# Patient Record
Sex: FEMALE | Race: WHITE | NOT HISPANIC OR LATINO | Employment: FULL TIME | URBAN - METROPOLITAN AREA
[De-identification: names, ages, dates, MRNs, and addresses within clinical notes are randomized per-mention and may not be internally consistent; named-entity substitution may affect disease eponyms.]

---

## 2017-01-01 PROBLEM — Z30.431 SURVEILLANCE OF (INTRAUTERINE) CONTRACEPTIVE DEVICE: Status: ACTIVE | Noted: 2017-01-01

## 2017-01-05 ENCOUNTER — RADIANT APPOINTMENT (OUTPATIENT)
Dept: GENERAL RADIOLOGY | Facility: CLINIC | Age: 32
End: 2017-01-05
Attending: FAMILY MEDICINE
Payer: COMMERCIAL

## 2017-01-05 ENCOUNTER — OFFICE VISIT (OUTPATIENT)
Dept: FAMILY MEDICINE | Facility: CLINIC | Age: 32
End: 2017-01-05
Payer: COMMERCIAL

## 2017-01-05 VITALS
TEMPERATURE: 98.2 F | SYSTOLIC BLOOD PRESSURE: 111 MMHG | BODY MASS INDEX: 17.58 KG/M2 | DIASTOLIC BLOOD PRESSURE: 75 MMHG | OXYGEN SATURATION: 98 % | RESPIRATION RATE: 16 BRPM | WEIGHT: 103 LBS | HEART RATE: 73 BPM | HEIGHT: 64 IN

## 2017-01-05 DIAGNOSIS — M25.531 RIGHT WRIST PAIN: Primary | ICD-10-CM

## 2017-01-05 DIAGNOSIS — M25.531 RIGHT WRIST PAIN: ICD-10-CM

## 2017-01-05 DIAGNOSIS — Z80.8 FAMILY HISTORY OF MELANOMA: ICD-10-CM

## 2017-01-05 PROCEDURE — 73110 X-RAY EXAM OF WRIST: CPT | Mod: RT

## 2017-01-05 PROCEDURE — 99203 OFFICE O/P NEW LOW 30 MIN: CPT | Performed by: FAMILY MEDICINE

## 2017-01-05 RX ORDER — ASCORBIC ACID 125 MG
TABLET,CHEWABLE ORAL
COMMUNITY

## 2017-01-05 NOTE — MR AVS SNAPSHOT
After Visit Summary   1/5/2017    Jane Hernandez    MRN: [id number]           Patient Information     Date Of Birth          1985        Visit Information        Provider Department      1/5/2017 1:40 PM Cici Bradley MD [facility]        Today's Diagnoses     Right wrist pain    -  1     Family history of melanoma            Follow-ups after your visit        Additional Services     DERMATOLOGY REFERRAL       Your provider has referred you to: [facility]: [facility] [place] [phone]   [email or web address]    Please be aware that coverage of these services is subject to the terms and limitations of your health insurance plan.  Call member services at your health plan with any benefit or coverage questions.      Please bring the following with you to your appointment:    (1) Any X-Rays, CTs or MRIs which have been performed.  Contact the facility where they were done to arrange for  prior to your scheduled appointment.    (2) List of current medications  (3) This referral request   (4) Any documents/labs given to you for this referral                  Who to contact     If you have questions or need follow up information about today's clinic visit or your schedule please contact [facility] directly at [phone].  Normal or non-critical lab and imaging results will be communicated to you by MyChart, letter or phone within 4 business days after the clinic has received the results. If you do not hear from us within 7 days, please contact the clinic through MyChart or phone. If you have a critical or abnormal lab result, we will notify you by phone as soon as possible.  Submit refill requests through [employer] or call your pharmacy and they will forward the refill request to us. Please allow 3 business days for your refill to be completed.          Additional Information About Your Visit        MyChart Information   "   [employer] lets you send messages to your doctor, view your test results, renew your prescriptions, schedule appointments and more. To sign up, go to [email or web address] . Click on \"Log in\" on the left side of the screen, which will take you to the Welcome page. Then click on \"Sign up Now\" on the right side of the page.     You will be asked to enter the access code listed below, as well as some personal information. Please follow the directions to create your username and password.     Your access code is: [email or web address]  Expires: 3/23/2017  9:30 AM     Your access code will  in 90 days. If you need help or a new code, please call your [place] clinic or [phone].        Care EveryWhere ID     This is your Care EveryWhere ID. This could be used by other organizations to access your [place] medical records  [id number]        Your Vitals Were     Pulse Temperature Respirations Height BMI (Body Mass Index) Pulse Oximetry    73 98.2  F (36.8  C) (Oral) 16 5' 4\" (1.626 m) 17.67 kg/m2 98%    Breastfeeding?                   No            Blood Pressure from Last 3 Encounters:   17 111/75   16 117/68   11/25/15 111/64    Weight from Last 3 Encounters:   17 103 lb (46.72 kg)   16 102 lb (46.267 kg)              We Performed the Following     DERMATOLOGY REFERRAL        Primary Care Provider    No Ref-Primary Verified       No address on file        Thank you!     Thank you for choosing [facility]  for your care. Our goal is always to provide you with excellent care. Hearing back from our patients is one way we can continue to improve our services. Please take a few minutes to complete the written survey that you may receive in the mail after your visit with us. Thank you!             Your Updated Medication List - Protect others around you: Learn how to safely use, store and throw away your medicines at www.disposemymeds.org.          This list is accurate as of: " 1/5/17  2:27 PM.  Always use your most recent med list.                   Brand Name Dispense Instructions for use    levonorgestrel 20 MCG/24HR IUD    MIRENA     1 each by Intrauterine route once       MULTI ADULT GUMMIES Chew

## 2017-01-05 NOTE — PROGRESS NOTES
HPI  CC: 30 yo F presents to establish care and has right wrist pain.  Musculoskeletal problem/pain      Duration: ongoing     Description  Location: right wrist     Intensity:  mild    Accompanying signs and symptoms: none    History  Previous similar problem: no   Previous evaluation:  x-ray 6 years ago     Precipitating or alleviating factors:  Trauma or overuse: no   Aggravating factors include: lifting, weight bearing, extension and pressure     Therapies tried and outcome: physical therapy,     Additional: Pt is here to establish care with PCP.       In her early 20s, she started having pain in both wrists, negative x-rays at that time, went to one or two physical therapy appts, and the condition seemed to resolve on its own.  In the last few months, she started taking bar class; since starting this exercise, she began having similar wrist pain; it was harder to do a push up or any other weight-bearing exercise with wrist extension.   The left wrist pain resolved on its own, but the right wrist pain is persistent and maybe getting a little worse.  She still has pain only with wrist extension.  Even pulling off a sock caused brief wrist pain.  In neutral position, she has no pain.  She has to be careful with pulling open doors, picking up her purse, etc. There is no weakness, numbness or tingling.  There was no specific initial injury, just overuse.  She is left-handed.  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      Father with melanoma, she was getting derm skin checks, needs referral    She has an IUD.     Review of Systems   Musculoskeletal: Positive for joint pain (right wrist only).   All other systems reviewed and are negative.      Allergies   Allergen Reactions     Penicillins      Sulfa Drugs      Current Outpatient Prescriptions   Medication     Multiple Vitamins-Minerals (MULTI ADULT GUMMIES) CHEW     levonorgestrel (MIRENA) 20 MCG/24HR IUD     No current  facility-administered medications for this visit.     Active Ambulatory Problems     Diagnosis Date Noted     Surveillance of (intrauterine) contraceptive device 01/01/2017     Resolved Ambulatory Problems     Diagnosis Date Noted     No Resolved Ambulatory Problems     Past Medical History   Diagnosis Date     Allergic state      Past Surgical History   Procedure Laterality Date     Tonsillectomy       Family History   Problem Relation Age of Onset     Thyroid Disease Mother      hypothyroid     Hypertension Mother      Melanoma Father      Social History     Social History     Marital Status: Single     Spouse Name: N/A     Number of Children: N/A     Years of Education: N/A     Occupational History     Not on file.     Social History Main Topics     Smoking status: Never Smoker      Smokeless tobacco: Not on file     Alcohol Use: 0.0 oz/week     0 Standard drinks or equivalent per week      Comment: occasional     Drug Use: No     Sexual Activity:     Partners: Male     Birth Control/ Protection: IUD     Other Topics Concern     Not on file     Social History Narrative       Physical Exam   Constitutional: She is well-developed, well-nourished, and in no distress. No distress.   HENT:   Head: Normocephalic and atraumatic.   Right Ear: External ear normal.   Left Ear: External ear normal.   Nose: Nose normal.   Mouth/Throat: Oropharynx is clear and moist. No oropharyngeal exudate.   Eyes: Conjunctivae and EOM are normal. Pupils are equal, round, and reactive to light. Right eye exhibits no discharge. Left eye exhibits no discharge. No scleral icterus.   Neck: Normal range of motion. No thyromegaly present.   Cardiovascular: Normal rate, regular rhythm and normal heart sounds.  Exam reveals no gallop and no friction rub.    No murmur heard.  Pulmonary/Chest: Effort normal and breath sounds normal. No respiratory distress. She has no wheezes. She has no rales.   Abdominal: Soft. Bowel sounds are normal.  "  Musculoskeletal: Normal range of motion. She exhibits no edema or tenderness.   Bilateral wrists: no deformity, erythema or swelling, full ROM and normal strength, right wrist painful with extension against resistance.   Right elbow exam is normal.    Lymphadenopathy:     She has no cervical adenopathy.   Neurological: She is alert.   Skin: Skin is warm and dry. She is not diaphoretic.   Psychiatric: Affect normal.   Vitals reviewed.      /75 mmHg  Pulse 73  Temp(Src) 98.2  F (36.8  C) (Oral)  Resp 16  Ht 5' 4\" (1.626 m)  Wt 103 lb (46.72 kg)  BMI 17.67 kg/m2  SpO2 98%  Breastfeeding? No      A/P  1.  Right wrist pain: most likely tendonitis; brace given and advised to rest, ice, use NSAIDS to decrease inflammation; after 1-2 weeks, would re-evaluate.  Plain films today.    2. Family h/o melanoma: referral to dermatology done.    "

## 2017-01-05 NOTE — NURSING NOTE
"Chief Complaint   Patient presents with     Establish Care       Initial /75 mmHg  Pulse 73  Temp(Src) 98.2  F (36.8  C) (Oral)  Resp 16  Ht 5' 4\" (1.626 m)  Wt 103 lb (46.72 kg)  BMI 17.67 kg/m2  SpO2 98%  Breastfeeding? No Estimated body mass index is 17.67 kg/(m^2) as calculated from the following:    Height as of this encounter: 5' 4\" (1.626 m).    Weight as of this encounter: 103 lb (46.72 kg).  BP completed using cuff size: regular        Hermes Warren MA      "

## 2017-01-12 ENCOUNTER — TELEPHONE (OUTPATIENT)
Dept: FAMILY MEDICINE | Facility: CLINIC | Age: 32
End: 2017-01-12

## 2017-01-12 NOTE — TELEPHONE ENCOUNTER
Please order right wrist brace so I can attach order with DME form.  Pt was seen on 1/5/2017    Thanks.      Hermes Warren MA

## 2017-01-19 ENCOUNTER — TRANSFERRED RECORDS (OUTPATIENT)
Dept: HEALTH INFORMATION MANAGEMENT | Facility: CLINIC | Age: 32
End: 2017-01-19

## 2017-01-20 PROBLEM — Z12.4 CERVICAL CANCER SCREENING: Status: ACTIVE | Noted: 2017-01-20

## 2017-01-20 PROBLEM — Z30.431 SURVEILLANCE OF (INTRAUTERINE) CONTRACEPTIVE DEVICE: Status: RESOLVED | Noted: 2017-01-01 | Resolved: 2017-01-20

## 2018-06-17 ENCOUNTER — HEALTH MAINTENANCE LETTER (OUTPATIENT)
Age: 33
End: 2018-06-17

## 2018-07-20 ENCOUNTER — TRANSFERRED RECORDS (OUTPATIENT)
Dept: HEALTH INFORMATION MANAGEMENT | Facility: CLINIC | Age: 33
End: 2018-07-20

## 2018-07-24 ASSESSMENT — ANXIETY QUESTIONNAIRES
GAD7 TOTAL SCORE: 2
2. NOT BEING ABLE TO STOP OR CONTROL WORRYING: NOT AT ALL
4. TROUBLE RELAXING: NOT AT ALL
GAD7 TOTAL SCORE: 2
5. BEING SO RESTLESS THAT IT IS HARD TO SIT STILL: NOT AT ALL
3. WORRYING TOO MUCH ABOUT DIFFERENT THINGS: SEVERAL DAYS
GAD7 TOTAL SCORE: 2
7. FEELING AFRAID AS IF SOMETHING AWFUL MIGHT HAPPEN: NOT AT ALL
6. BECOMING EASILY ANNOYED OR IRRITABLE: NOT AT ALL
1. FEELING NERVOUS, ANXIOUS, OR ON EDGE: SEVERAL DAYS
7. FEELING AFRAID AS IF SOMETHING AWFUL MIGHT HAPPEN: NOT AT ALL

## 2018-07-24 ASSESSMENT — ENCOUNTER SYMPTOMS
BACK PAIN: 0
ARTHRALGIAS: 1
MYALGIAS: 0
MUSCLE WEAKNESS: 0
MUSCLE CRAMPS: 0
JOINT SWELLING: 0
NECK PAIN: 0
STIFFNESS: 0

## 2018-07-25 ENCOUNTER — OFFICE VISIT (OUTPATIENT)
Dept: FAMILY MEDICINE | Facility: CLINIC | Age: 33
End: 2018-07-25
Payer: COMMERCIAL

## 2018-07-25 ENCOUNTER — MYC MEDICAL ADVICE (OUTPATIENT)
Dept: FAMILY MEDICINE | Facility: CLINIC | Age: 33
End: 2018-07-25

## 2018-07-25 VITALS
OXYGEN SATURATION: 100 % | DIASTOLIC BLOOD PRESSURE: 68 MMHG | HEART RATE: 65 BPM | BODY MASS INDEX: 18.27 KG/M2 | SYSTOLIC BLOOD PRESSURE: 109 MMHG | WEIGHT: 107 LBS | HEIGHT: 64 IN | RESPIRATION RATE: 18 BRPM | TEMPERATURE: 98.2 F

## 2018-07-25 DIAGNOSIS — Z83.49 FAMILY HISTORY OF THYROID DISEASE: ICD-10-CM

## 2018-07-25 DIAGNOSIS — Z78.9 VEGETARIAN DIET: Primary | ICD-10-CM

## 2018-07-25 DIAGNOSIS — Z00.00 ROUTINE GENERAL MEDICAL EXAMINATION AT A HEALTH CARE FACILITY: Primary | ICD-10-CM

## 2018-07-25 DIAGNOSIS — Z23 NEED FOR TDAP VACCINATION: ICD-10-CM

## 2018-07-25 LAB
CHOLEST SERPL-MCNC: 131 MG/DL
HDLC SERPL-MCNC: 55 MG/DL
LDLC SERPL CALC-MCNC: 58 MG/DL
NONHDLC SERPL-MCNC: 76 MG/DL
TRIGL SERPL-MCNC: 90 MG/DL
TSH SERPL DL<=0.005 MIU/L-ACNC: 2.5 MU/L (ref 0.4–4)

## 2018-07-25 PROCEDURE — 90715 TDAP VACCINE 7 YRS/> IM: CPT | Performed by: FAMILY MEDICINE

## 2018-07-25 PROCEDURE — 83540 ASSAY OF IRON: CPT | Performed by: FAMILY MEDICINE

## 2018-07-25 PROCEDURE — 36415 COLL VENOUS BLD VENIPUNCTURE: CPT | Performed by: FAMILY MEDICINE

## 2018-07-25 PROCEDURE — 90471 IMMUNIZATION ADMIN: CPT | Performed by: FAMILY MEDICINE

## 2018-07-25 PROCEDURE — 83550 IRON BINDING TEST: CPT | Performed by: FAMILY MEDICINE

## 2018-07-25 PROCEDURE — 99395 PREV VISIT EST AGE 18-39: CPT | Mod: 25 | Performed by: FAMILY MEDICINE

## 2018-07-25 PROCEDURE — 84443 ASSAY THYROID STIM HORMONE: CPT | Performed by: FAMILY MEDICINE

## 2018-07-25 PROCEDURE — 82607 VITAMIN B-12: CPT | Performed by: FAMILY MEDICINE

## 2018-07-25 PROCEDURE — 80061 LIPID PANEL: CPT | Performed by: FAMILY MEDICINE

## 2018-07-25 ASSESSMENT — ANXIETY QUESTIONNAIRES: GAD7 TOTAL SCORE: 2

## 2018-07-25 NOTE — MR AVS SNAPSHOT
After Visit Summary   7/25/2018    Jane Hernandez    MRN: [id number]           Patient Information     Date Of Birth          1985        Visit Information        Provider Department      7/25/2018 8:00 AM Cici Bradley MD [facility]        Today's Diagnoses     Routine general medical examination at a health care facility    -  1    Need for Tdap vaccination        Family history of thyroid disease          Care Instructions      Preventive Health Recommendations  Female Ages 26 - 39  Yearly exam:   See your health care provider every year in order to    Review health changes.     Discuss preventive care.      Review your medicines if you your doctor has prescribed any.    Until age 30: Get a Pap test every three years (more often if you have had an abnormal result).    After age 30: Talk to your doctor about whether you should have a Pap test every 3 years or have a Pap test with HPV screening every 5 years.   You do not need a Pap test if your uterus was removed (hysterectomy) and you have not had cancer.  You should be tested each year for STDs (sexually transmitted diseases), if you're at risk.   Talk to your provider about how often to have your cholesterol checked.  If you are at risk for diabetes, you should have a diabetes test (fasting glucose).  Shots: Get a flu shot each year. Get a tetanus shot every 10 years.   Nutrition:     Eat at least 5 servings of fruits and vegetables each day.    Eat whole-grain bread, whole-wheat pasta and brown rice instead of white grains and rice.    Get adequate Calcium and Vitamin D.     Lifestyle    Exercise at least 150 minutes a week (30 minutes a day, 5 days of the week). This will help you control your weight and prevent disease.    Limit alcohol to one drink per day.    No smoking.     Wear sunscreen to prevent skin cancer.    See your dentist every six months for an exam and cleaning.            Follow-ups after your  "visit        Your next 10 appointments already scheduled     Jul 27, 2018  8:00 AM CDT   Annual Visit with FATOUMATA Magallanes [facility]   [facility] ([facility])    [facility] [address]  [address]  [facility] [address] [phone]              Who to contact     If you have questions or need follow up information about today's clinic visit or your schedule please contact [facility] directly at [phone].  Normal or non-critical lab and imaging results will be communicated to you by [employer], letter or phone within 4 business days after the clinic has received the results. If you do not hear from us within 7 days, please contact the clinic through [employer] or phone. If you have a critical or abnormal lab result, we will notify you by phone as soon as possible.  Submit refill requests through [employer] or call your pharmacy and they will forward the refill request to us. Please allow 3 business days for your refill to be completed.          Additional Information About Your Visit        MyChart Information     [employer] gives you secure access to your electronic health record. If you see a primary care provider, you can also send messages to your care team and make appointments. If you have questions, please call your primary care clinic.  If you do not have a primary care provider, please call [phone] and they will assist you.        Care EveryWhere ID     This is your Care EveryWhere ID. This could be used by other organizations to access your [place] medical records  [id number]        Your Vitals Were     Pulse Temperature Respirations Height Last Period Pulse Oximetry    65 98.2  F (36.8  C) (Tympanic) 18 5' 4\" (1.626 m) (LMP Unknown) 100%    BMI (Body Mass Index)                   18.37 kg/m2            Blood Pressure from Last 3 Encounters:   07/25/18 109/68   01/05/17 111/75   12/29/16 117/68    Weight from Last " 3 Encounters:   07/25/18 107 lb (48.5 kg)   01/05/17 103 lb (46.7 kg)   12/29/16 102 lb (46.3 kg)              We Performed the Following     Lipid Profile     TDAP, IM (10 - 64 YRS) - Adacel     TSH with free T4 reflex        Primary Care Provider Office Phone # Fax #    Cici Bradley [phone] [address] [place] [address]        Equal Access to Services     MATTIE ROSE : Hadii aad ku hadasho Soomaali, waaxda luqadaha, qaybta kaalmada adeegyada, waxay idiin hayaan adeeg nova rader . So [facility] [phone].    ATENCIÓN: Si habla español, tiene a wells disposición servicios gratuitos de asistencia lingüística. Llame al [phone].    We comply with applicable federal civil rights laws and [place] laws. We do not discriminate on the basis of race, color, national origin, age, disability, sex, sexual orientation, or gender identity.            Thank you!     Thank you for choosing [facility]  for your care. Our goal is always to provide you with excellent care. Hearing back from our patients is one way we can continue to improve our services. Please take a few minutes to complete the written survey that you may receive in the mail after your visit with us. Thank you!             Your Updated Medication List - Protect others around you: Learn how to safely use, store and throw away your medicines at www.disposemymeds.org.          This list is accurate as of 7/25/18  9:16 PM.  Always use your most recent med list.                   Brand Name Dispense Instructions for use Diagnosis    levonorgestrel 20 MCG/24HR IUD    MIRENA     1 each by Intrauterine route once        MULTI ADULT GUMMIES Chew           order for DME     1 each    Equipment being ordered: right wrist brace    Right wrist pain

## 2018-07-25 NOTE — TELEPHONE ENCOUNTER
Dr Bradley,    Pt asking if he has any concerns as he is long time vegetarian.    What do you advise?  Lucia Orellana RN

## 2018-07-25 NOTE — PROGRESS NOTES
"   SUBJECTIVE:   CC: Jane Hernandez is an 32 year old woman who presents for preventive health visit.     Physical   Annual:     Getting at least 3 servings of Calcium per day:  NO    Bi-annual eye exam:  Yes    Dental care twice a year:  NO    Sleep apnea or symptoms of sleep apnea:  None    Diet:  Vegetarian/vegan    Frequency of exercise:  2-3 days/week    Duration of exercise:  Less than 15 minutes    Taking medications regularly:  Not Applicable    Additional concerns today:  YES    CV: no family h/o early CAD, but mom has HL and the patient would like to have her lipids checked.  She is vegetarian and active.   Malignancy: her father has had melanoma and prostate cancer (is doing well).  She follows with dermatology.  She will have her pap smear done with OB/GYN this week.    Bone health; vegetarian diet and not getting much calcium  Immunizations: tdap is due.  Sexual health: no concerns.    Depression screen: see below.     Other: the patient's mother has hypothyroidism (which came on after her pregnancy with the patient).  The patient would like her thyroid tested.  She has no symptoms of concern.     She has been having some left knee pain and has seen orthopedics.  A cortisone shot was done for pes bursitis, but she is still bothered by \"an awareness,\" not pain, of her knee.        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            Thyroid check, thinks shes due for tetanus as well.     Today's PHQ-2 Score:   PHQ-2 ( 1999 Pfizer) 7/24/2018   Q1: Little interest or pleasure in doing things 0   Q2: Feeling down, depressed or hopeless 0   PHQ-2 Score 0   Q1: Little interest or pleasure in doing things Not at all   Q2: Feeling down, depressed or hopeless Not at all   PHQ-2 Score 0       Abuse: Current or Past(Physical, Sexual or Emotional)- No  Do you feel safe in your environment - Yes    Social History   Substance Use Topics     Smoking status: Never Smoker     Smokeless tobacco: Never Used     Alcohol use 0.0 oz/week "     0 Standard drinks or equivalent per week      Comment: occasional     Alcohol Use 7/24/2018   If you drink alcohol do you typically have greater than 3 drinks per day OR greater than 7 drinks per week? No       Reviewed orders with patient.  Reviewed health maintenance and updated orders accordingly - Yes  Patient Active Problem List   Diagnosis     Cervical cancer screening     Contraception     Vegetarian diet     Past Surgical History:   Procedure Laterality Date     TONSILLECTOMY  2004       Social History   Substance Use Topics     Smoking status: Never Smoker     Smokeless tobacco: Never Used     Alcohol use 0.0 oz/week     0 Standard drinks or equivalent per week      Comment: occasional     Family History   Problem Relation Age of Onset     Thyroid Disease Mother      hypothyroid     Hypertension Mother      Melanoma Father      Prostate Cancer Father          Current Outpatient Prescriptions   Medication Sig Dispense Refill     levonorgestrel (MIRENA) 20 MCG/24HR IUD 1 each by Intrauterine route once       Multiple Vitamins-Minerals (MULTI ADULT GUMMIES) CHEW        order for DME Equipment being ordered: right wrist brace 1 each 0     Allergies   Allergen Reactions     Penicillins      Sulfa Drugs        Mammogram not appropriate for this patient based on age.    Pertinent mammograms are reviewed under the imaging tab.  History of abnormal Pap smear:      Reviewed and updated as needed this visit by clinical staff  Tobacco  Allergies  Meds  Med Hx  Surg Hx  Fam Hx  Soc Hx        Reviewed and updated as needed this visit by Provider            Review of Systems  CONSTITUTIONAL: NEGATIVE for fever, chills, change in weight  INTEGUMENTARU/SKIN: NEGATIVE for worrisome rashes, moles or lesions  EYES: NEGATIVE for vision changes or irritation  ENT: NEGATIVE for ear, mouth and throat problems  RESP: NEGATIVE for significant cough or SOB  BREAST: NEGATIVE for masses, tenderness or discharge  CV: NEGATIVE  for chest pain, palpitations or peripheral edema  GI: NEGATIVE for nausea, abdominal pain, heartburn, or change in bowel habits  : NEGATIVE for unusual urinary or vaginal symptoms. Periods are regular.  MUSCULOSKELETAL: NEGATIVE for significant arthralgias or myalgia  NEURO: NEGATIVE for weakness, dizziness or paresthesias  PSYCHIATRIC: NEGATIVE for changes in mood or affect     OBJECTIVE:   There were no vitals taken for this visit.  Physical Exam  GENERAL: healthy, alert and no distress  EYES: Eyes grossly normal to inspection, PERRL and conjunctivae and sclerae normal  HENT: ear canals and TM's normal, nose and mouth without ulcers or lesions  NECK: no adenopathy, no asymmetry, masses, or scars and thyroid normal to palpation  RESP: lungs clear to auscultation - no rales, rhonchi or wheezes  BREAST: normal without masses, tenderness or nipple discharge and no palpable axillary masses or adenopathy  CV: regular rate and rhythm, normal S1 S2, no S3 or S4, no murmur, click or rub, no peripheral edema and peripheral pulses strong  ABDOMEN: soft, nontender, no hepatosplenomegaly, no masses and bowel sounds normal  MS: no gross musculoskeletal defects noted, no edema.  Left knee with full ROM, tight patellar tendon, otherwise normal testing of the knee.   SKIN: no suspicious lesions or rashes  NEURO: Normal strength and tone, mentation intact and speech normal  PSYCH: mentation appears normal, affect normal/bright        ASSESSMENT/PLAN:   1. Routine general medical examination at a health care facility  CV: lipid panel  Malignancy: patient to have pap with her ob/gyn.    Bone health: recommended a calcium/d supplement if not getting through diet  Immunizations: tdap today.  - Lipid Profile    2. Need for Tdap vaccination    - TDAP, IM (10 - 64 YRS) - Adacel    3. Family history of thyroid disease    - TSH with free T4 reflex    COUNSELING:  Reviewed preventive health counseling, as reflected in patient  "instructions    BP Readings from Last 1 Encounters:   01/05/17 111/75     Estimated body mass index is 17.68 kg/(m^2) as calculated from the following:    Height as of 1/5/17: 5' 4\" (1.626 m).    Weight as of 1/5/17: 103 lb (46.7 kg).           reports that she has never smoked. She has never used smokeless tobacco.      Counseling Resources:  ATP IV Guidelines  Pooled Cohorts Equation Calculator  Breast Cancer Risk Calculator  FRAX Risk Assessment  ICSI Preventive Guidelines  Dietary Guidelines for Americans, 2010  USDA's MyPlate  ASA Prophylaxis  Lung CA Screening    Cici Bradley MD  [facility]  Answers for HPI/ROS submitted by the patient on 7/24/2018   PHQ-2 Score: 0    "

## 2018-07-25 NOTE — NURSING NOTE
Screening Questionnaire for Adult Immunization    Are you sick today?   No   Do you have allergies to medications, food, a vaccine component or latex?   No   Have you ever had a serious reaction after receiving a vaccination?   No   Do you have a long-term health problem with heart disease, lung disease, asthma, kidney disease, metabolic disease (e.g. diabetes), anemia, or other blood disorder?   No   Do you have cancer, leukemia, HIV/AIDS, or any other immune system problem?   No   In the past 3 months, have you taken medications that affect  your immune system, such as prednisone, other steroids, or anticancer drugs; drugs for the treatment of rheumatoid arthritis, Crohn s disease, or psoriasis; or have you had radiation treatments?   No   Have you had a seizure, or a brain or other nervous system problem?   No   During the past year, have you received a transfusion of blood or blood     products, or been given immune (gamma) globulin or antiviral drug?   No   For women: Are you pregnant or is there a chance you could become        pregnant during the next month?   No   Have you received any vaccinations in the past 4 weeks?   No     Immunization questionnaire answers were all negative.        Per orders of Dr. Bradley, injection of Tdap given by Miladis Orellana. Patient instructed to remain in clinic for 15 minutes afterwards, and to report any adverse reaction to me immediately.       Screening performed by Miladis Orellana on 7/25/2018 at 8:29 AM.

## 2018-07-26 LAB
IRON SATN MFR SERPL: 40 % (ref 15–46)
IRON SERPL-MCNC: 148 UG/DL (ref 35–180)
TIBC SERPL-MCNC: 367 UG/DL (ref 240–430)
VIT B12 SERPL-MCNC: 546 PG/ML (ref 193–986)

## 2018-07-27 ENCOUNTER — OFFICE VISIT (OUTPATIENT)
Dept: OBGYN | Facility: CLINIC | Age: 33
End: 2018-07-27
Attending: NURSE PRACTITIONER
Payer: COMMERCIAL

## 2018-07-27 VITALS
SYSTOLIC BLOOD PRESSURE: 110 MMHG | BODY MASS INDEX: 18.18 KG/M2 | WEIGHT: 106.5 LBS | HEIGHT: 64 IN | DIASTOLIC BLOOD PRESSURE: 67 MMHG | HEART RATE: 69 BPM

## 2018-07-27 DIAGNOSIS — Z12.4 SCREENING FOR MALIGNANT NEOPLASM OF CERVIX: ICD-10-CM

## 2018-07-27 DIAGNOSIS — Z01.419 ENCOUNTER FOR GYNECOLOGICAL EXAMINATION WITHOUT ABNORMAL FINDING: ICD-10-CM

## 2018-07-27 DIAGNOSIS — Z00.00 VISIT FOR PREVENTIVE HEALTH EXAMINATION: Primary | ICD-10-CM

## 2018-07-27 PROCEDURE — G0145 SCR C/V CYTO,THINLAYER,RESCR: HCPCS | Performed by: NURSE PRACTITIONER

## 2018-07-27 PROCEDURE — G0463 HOSPITAL OUTPT CLINIC VISIT: HCPCS | Mod: ZF

## 2018-07-27 PROCEDURE — 87624 HPV HI-RISK TYP POOLED RSLT: CPT | Performed by: NURSE PRACTITIONER

## 2018-07-27 ASSESSMENT — PAIN SCALES - GENERAL: PAINLEVEL: NO PAIN (0)

## 2018-07-27 NOTE — PATIENT INSTRUCTIONS
1. Pap Smear and HPV testing done today, we will call you or notify you of your results via [employer].   2. Plan to come back in one year for another annual exam.   3. IUD due for removal in one year, you can return to clinic to discuss options with provider at this time.    PREVENTIVE HEALTH RECOMMENDATIONS:   Most women need a yearly breast and pelvic exam.    A PAP screen, a test done DURING a pelvic exam, is NO longer recommended yearly.    March 2013, screening guidelines recommended by ACOG for PAP screen are:    1) First pap at age 21.    2) Pap every 3 years until age 30.    3) After age 30, pap every 3 years or Pap with HR HPV screen every 5 years until age 65.  4) Women do NOT need a vaginal Pap screen after a hysterectomy (surgical removal of the uterus) when they have not had cancer.    Exceptions:  1) Yearly pap if HIV+ or immunosuppressed secondary to organ transplant  2) BELEN II-III continue routine screening for 20 years.    I encourage you continue looking for opportunities to choose a healthy lifestyle:       * Choose to eat a heart healthy diet. Check out the FOOD PLATE guidelines at: http://www.choosemyplate.gov/ for helpful hints on weight and cholesterol management.  Balance your caloric intake with exercise to maintain a BMI in the 22 to 26 range. For bone health: Eat calcium-rich foods like yogurt, broccoli or take chewable calcium pills (500 to 600 mg) twice a day with food.       * Exercise for at least an average of 30 minutes a day, 5 days of the week. This will help you control your weight, release stress, and help prevent disease.      * Take a Vitamin D3 supplement daily fall through spring and during summer unless you [id number]' full body sun exposure to skin without sunscreen.      * DO wear sunscreen to prevent skin cancer after the first 15-30 minutes.      * Identify stressors in your life, find ways to release the stress, and, make time for yourself. PLEASE ask for help if mood  changes last longer than two weeks.     * Limit alcohol to one drink per day.  No smoking.  Avoid second hand smoke. If you smoke, ask for help to stop.       *  If you are in a sexual relationship, talk with your partner about possible infection risks and take action to protect yourself from exposure to a sexual infection.    Please request an infection screen for STIs (sexually transmitted infections) if you are less than age 26 OR believe that you may be at risk.     Get a flu shot each year. Get a tetanus shot every 10 years. EVERYONE needs a pertussis (Whooping cough) booster.    See your dentist twice a year for an exam and preventive care cleaning.     Consider the following screen tests:    1) cholesterol test every 5 years.     2) yearly mammogram after age 40 unless you have identified risks.    3) colonoscopy every 10 years after age 50 unless you have identified risks.    4) diabetes blood test screening if you are at risk for diabetes.      Additional information that you may also find helpful:  The Internet now gives us access to LOTS of information -- some of it helpful, research documented and also plenty of harmful, anecdotal information that may not pertain to your situtaion. Consider visiting the following websites for accurate health information:    www.vitamindcouncil.org/ : Info and ongoing research re Vitamin D    www.fairview.org : Up to date and easily searchable information on multiple topics.    www.medlineplus.gov : medication info, interactive tutorials, watch real surgeries online    www.cdc.gov : public health info, travel advisories, epidemics (H1N1)    www.anabela/std.org: current research re diagnosis, treatment and prevention of sexually contacted infections.    www.health.[facility].[place].us : [place] dept of heatlh, public health issues in [place], N1N1    www.familydoctor.org : good info from the Academy of Family Physicians

## 2018-07-27 NOTE — LETTER
2018       RE: Jane Hernandez  [address] [place] [address]     Dear Colleague,    Thank you for referring your patient, Jane Hernandez, to the [facility] at [facility]. Please see a copy of my visit note below.        Progress Note    SUBJECTIVE:  Jane Hernandez is an 32 year old, , who requests an Annual Preventive Exam.   PCP: Dr. Bradley (), annual exam on 2018.    Concerns today include:     1. Reproductive planning: Jane currently is using the Mirena IUD for contraception. She likes this method but is interested in options for reproductive planning. Does not anticipate conception for at least 2 years, is [place] there are [place]     2. Preventative Health: Sees family practice physician for preventative care, annual exam was two days ago. Lipid profile, TSH, B12 and iron levels were ordered, Tdap provided, preventative health recommendations reviewed. Last pap smear was in , due today. No history of abnormal pap.     Sexual health: Jane is in a 2 year relationship with male partner. Denies sexual health concerns, declines screening for STIs. Denies changes in vaginal discharge, odor or itching. Intermittently spotting with Mirena IUD, most recent spotting started 2018.     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 Reports vegetarian diet and exercises 2-3 days/week. Denies mental health concerns. PHQ-9 and MARILYN-7 negative.    Menstrual History:  Menstrual History 2016   LAST MENSTRUAL PERIOD - 2018 -   Menarche age 12 - 12   Period Cycle (Days) 21 days  - irregualr period on mirena iud   Period Duration (Days) 7 days - 1-2   Method of Contraception Mirena IUD - Mirena IUD   Period Pattern Regular - Irregular   Menstrual Flow Heavy - Light   Menstrual Control  Tampon - Panty liner   Dysmenorrhea Mild - None   PMS Symptoms Cramping - -   Reviewed Today Yes - Yes   Comments without BC - -       Last pap smear:  NIL  History of abnormal Pap smear: NO - age 30-65 PAP every 5 years with negative HPV co-testing recommended.    Mammogram current: not applicable  No pertinent family history of breast cancer.    Last Colonoscopy: not applicable  No pertinent family history of colorectal cancer.    HISTORY:    Current Outpatient Prescriptions on File Prior to Visit:  levonorgestrel (MIRENA) 20 MCG/24HR IUD 1 each by Intrauterine route once Was inserted    Multiple Vitamins-Minerals (MULTI ADULT GUMMIES) CHEW      No current facility-administered medications on file prior to visit.   Allergies   Allergen Reactions     Penicillins      Sulfa Drugs      Immunization History   Administered Date(s) Administered     TDAP Vaccine (Adacel) 2008, 2018     Obstetric History       T0      L0     SAB0   TAB0   Ectopic0   Multiple0   Live Births0      Past Medical History:   Diagnosis Date     Allergic state      Past Surgical History:   Procedure Laterality Date     TONSILLECTOMY  2004     Family History   Problem Relation Age of Onset     Thyroid Disease Mother      Hypothyroid     Hypertension Mother      Hyperlipidemia Mother      Melanoma Father      Prostate Cancer Father      Other Cancer Father      Melanoma     Social History     Social History     Marital status: Single     Spouse name: N/A     Number of children: N/A     Years of education: N/A     Social History Main Topics     Smoking status: Never Smoker     Smokeless tobacco: Never Used     Alcohol use 0.0 oz/week     0 Standard drinks or equivalent per week      Comment: occasional     Drug use: No     Sexual activity: Yes     Partners: Male     Birth control/ protection: Pull-out method, IUD     Other Topics Concern     None     Social History Narrative    How much exercise per week? 2-3 days  "   How much calcium per day? In foods and multi vits       How much caffeine per day? 1 coofee    How much vitamin D per day? In mutli vit s and foods    Do you/your family wear seatbelts?  Yes    Do you/your family use safety helmets? Yes    Do you/your family use sunscreen? Yes    Do you/your family keep firearms in the home? No    Do you/your family have a smoke detector(s)? Yes        [facility]             ROS  ROS: 10 point ROS neg other than the symptoms noted above in the HPI.    PHQ-9 SCORE 12/29/2016   Total Score 0     MARILYN-7 SCORE 7/24/2018   Total Score 2 (minimal anxiety)   Total Score 2       EXAM:  Blood pressure 110/67, pulse 69, height 1.626 m (5' 4\"), weight 48.3 kg (106 lb 8 oz), last menstrual period 07/25/2018, not currently breastfeeding. Body mass index is 18.28 kg/(m^2).  General - pleasant female in no acute distress.  Skin - no suspicious lesions or rashes    Breast Exam:  Breast: Without visible skin changes. No dimpling or lesions seen.   Breasts supple, non-tender with palpation, no dominant mass, nodularity, or nipple discharge noted bilaterally. Axillary nodes negative.      Pelvic Exam:  EG/BUS: Normal genital architecture without lesions, erythema or abnormal secretions; Bartholin's, Urethra, [place]'s normal   Urethral meatus: normal   Urethra: no masses, tenderness, or scarring   Bladder: no masses or tenderness   Vagina: moist, pink, rugae with creamy, odorless and blood tinged secretions  Cervix: Nulliparous, pink, moist, closed, without lesion or CMT and IUD strings extend 0.5 cm from external os. (pt reports IUD strings have always been very short)  Uterus: anteverted,  and small, smooth, firm, mobile w/o pain  Adnexa: Within normal limits and No masses, nodularity, tenderness  Rectum: anus normal     ASSESSMENT:  Encounter Diagnoses   Name Primary?     Visit for preventive health examination      Screening for malignant neoplasm of cervix      Encounter for " gynecological examination without abnormal finding       PLAN:   1. Pap Smear and HPV testing done today, will notify pt of results via phone or MyChart.  2. Pt to RTC in one year for another annual exam, and follow-up as needed.  3. IUD due for removal in one year, Pt instructed to RTC to discuss contraception options with at this time.  4. Provided patient with information on egg preservation and CCRM clinic in the [facility]; counseled patient on age related risks of pregnancy and potential decrease in fertility with age.    Orders Placed This Encounter   Procedures     Pelvic and Breast Exam Procedure []     Pap Smear Exam [] Do Not Remove     Pap imaged thin layer screen with HPV - recommended age 30 - 65 years (select HPV order below)     HPV High Risk Types DNA Cervical     Additional teaching done at this visit regarding self breast exam, birth control and mental health.    I, Farhana Solitario completed the PFSH and ROS. I then acted as a scribe for KANCHAN Wolfe, for the remainder of the visit.  Farhana Solitario, KANCHAN Student    I agree with the PFSH and ROS as completed by the KANCHAN Student, except for changes made by me.  The remainder of the encounter was performed by me and scribed by the KANCHAN Student.  The scribed note accurately reflects my personal services and decisions made by me.  Janae Bustos DNP, APRGABINO, KANCHAN

## 2018-07-27 NOTE — PROGRESS NOTES
Progress Note    SUBJECTIVE:  Jane Hernandez is an 32 year old, , who requests an Annual Preventive Exam.   PCP: Dr. Bradley (), annual exam on 2018.    Concerns today include:     1. Reproductive planning: Jane currently is using the Mirena IUD for contraception. She likes this method but is interested in options for reproductive planning. Does not anticipate conception for at least 2 years, is [place] there are [place]     2. Preventative Health: Sees family practice physician for preventative care, annual exam was two days ago. Lipid profile, TSH, B12 and iron levels were ordered, Tdap provided, preventative health recommendations reviewed. Last pap smear was in , due today. No history of abnormal pap.     Sexual health: Jane is in a 2 year relationship with male partner. Denies sexual health concerns, declines screening for STIs. Denies changes in vaginal discharge, odor or itching. Intermittently spotting with Mirena IUD, most recent spotting started 2018.     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 Reports vegetarian diet and exercises 2-3 days/week. Denies mental health concerns. PHQ-9 and MARILYN-7 negative.    Menstrual History:  Menstrual History 2016   LAST MENSTRUAL PERIOD - 2018 -   Menarche age 12 - 12   Period Cycle (Days) 21 days  - irregualr period on mirena iud   Period Duration (Days) 7 days - 1-2   Method of Contraception Mirena IUD - Mirena IUD   Period Pattern Regular - Irregular   Menstrual Flow Heavy - Light   Menstrual Control Tampon - Panty liner   Dysmenorrhea Mild - None   PMS Symptoms Cramping - -   Reviewed Today Yes - Yes   Comments without BC - -       Last pap smear:  NIL  History of abnormal Pap smear: NO - age 30-65 PAP every 5 years with negative HPV co-testing recommended.    Mammogram current:  not applicable  No pertinent family history of breast cancer.    Last Colonoscopy: not applicable  No pertinent family history of colorectal cancer.    HISTORY:    Current Outpatient Prescriptions on File Prior to Visit:  levonorgestrel (MIRENA) 20 MCG/24HR IUD 1 each by Intrauterine route once Was inserted    Multiple Vitamins-Minerals (MULTI ADULT GUMMIES) CHEW      No current facility-administered medications on file prior to visit.   Allergies   Allergen Reactions     Penicillins      Sulfa Drugs      Immunization History   Administered Date(s) Administered     TDAP Vaccine (Adacel) 2008, 2018     Obstetric History       T0      L0     SAB0   TAB0   Ectopic0   Multiple0   Live Births0      Past Medical History:   Diagnosis Date     Allergic state      Past Surgical History:   Procedure Laterality Date     TONSILLECTOMY       Family History   Problem Relation Age of Onset     Thyroid Disease Mother      Hypothyroid     Hypertension Mother      Hyperlipidemia Mother      Melanoma Father      Prostate Cancer Father      Other Cancer Father      Melanoma     Social History     Social History     Marital status: Single     Spouse name: N/A     Number of children: N/A     Years of education: N/A     Social History Main Topics     Smoking status: Never Smoker     Smokeless tobacco: Never Used     Alcohol use 0.0 oz/week     0 Standard drinks or equivalent per week      Comment: occasional     Drug use: No     Sexual activity: Yes     Partners: Male     Birth control/ protection: Pull-out method, IUD     Other Topics Concern     None     Social History Narrative    How much exercise per week? 2-3 days    How much calcium per day? In foods and multi vits       How much caffeine per day? 1 coofee    How much vitamin D per day? In mutli vit s and foods    Do you/your family wear seatbelts?  Yes    Do you/your family use safety helmets? Yes    Do you/your family use sunscreen? Yes    Do  "you/your family keep firearms in the home? No    Do you/your family have a smoke detector(s)? Yes        [facility]             ROS  ROS: 10 point ROS neg other than the symptoms noted above in the HPI.    PHQ-9 SCORE 12/29/2016   Total Score 0     MARILYN-7 SCORE 7/24/2018   Total Score 2 (minimal anxiety)   Total Score 2       EXAM:  Blood pressure 110/67, pulse 69, height 1.626 m (5' 4\"), weight 48.3 kg (106 lb 8 oz), last menstrual period 07/25/2018, not currently breastfeeding. Body mass index is 18.28 kg/(m^2).  General - pleasant female in no acute distress.  Skin - no suspicious lesions or rashes    Breast Exam:  Breast: Without visible skin changes. No dimpling or lesions seen.   Breasts supple, non-tender with palpation, no dominant mass, nodularity, or nipple discharge noted bilaterally. Axillary nodes negative.      Pelvic Exam:  EG/BUS: Normal genital architecture without lesions, erythema or abnormal secretions; Bartholin's, Urethra, [place]'s normal   Urethral meatus: normal   Urethra: no masses, tenderness, or scarring   Bladder: no masses or tenderness   Vagina: moist, pink, rugae with creamy, odorless and blood tinged secretions  Cervix: Nulliparous, pink, moist, closed, without lesion or CMT and IUD strings extend 0.5 cm from external os. (pt reports IUD strings have always been very short)  Uterus: anteverted,  and small, smooth, firm, mobile w/o pain  Adnexa: Within normal limits and No masses, nodularity, tenderness  Rectum: anus normal     ASSESSMENT:  Encounter Diagnoses   Name Primary?     Visit for preventive health examination      Screening for malignant neoplasm of cervix      Encounter for gynecological examination without abnormal finding       PLAN:   1. Pap Smear and HPV testing done today, will notify pt of results via phone or MyChart.  2. Pt to RTC in one year for another annual exam, and follow-up as needed.  3. IUD due for removal in one year, Pt instructed to RTC " to discuss contraception options with at this time.  4. Provided patient with information on egg preservation and CCRM clinic in the [facility]; counseled patient on age related risks of pregnancy and potential decrease in fertility with age.    Orders Placed This Encounter   Procedures     Pelvic and Breast Exam Procedure []     Pap Smear Exam [] Do Not Remove     Pap imaged thin layer screen with HPV - recommended age 30 - 65 years (select HPV order below)     HPV High Risk Types DNA Cervical     Additional teaching done at this visit regarding self breast exam, birth control and mental health.    I, Farhana Solitario completed the PFSH and ROS. I then acted as a scribe for KANCHAN Wolfe, for the remainder of the visit.  Farhana Solitario, KANCHAN Student    I agree with the PFSH and ROS as completed by the KANCHAN Student, except for changes made by me.  The remainder of the encounter was performed by me and scribed by the KANCHAN Student.  The scribed note accurately reflects my personal services and decisions made by me.  Janae Bustos DNP, APRGABINO, KANCHAN

## 2018-07-27 NOTE — MR AVS SNAPSHOT
After Visit Summary   7/27/2018    Jane Hernandez    MRN: [id number]           Patient Information     Date Of Birth          1985        Visit Information        Provider Department      7/27/2018 8:00 AM Janae Bustos APRN [facility]        Today's Diagnoses     Visit for preventive health examination        Screening for malignant neoplasm of cervix        Encounter for gynecological examination without abnormal finding          Care Instructions    1. Pap Smear and HPV testing done today, we will call you or notify you of your results via [employer].   2. Plan to come back in one year for another annual exam.   3. IUD due for removal in one year, you can return to clinic to discuss options with provider at this time.    PREVENTIVE HEALTH RECOMMENDATIONS:   Most women need a yearly breast and pelvic exam.    A PAP screen, a test done DURING a pelvic exam, is NO longer recommended yearly.    March 2013, screening guidelines recommended by ACOG for PAP screen are:    1) First pap at age 21.    2) Pap every 3 years until age 30.    3) After age 30, pap every 3 years or Pap with HR HPV screen every 5 years until age 65.  4) Women do NOT need a vaginal Pap screen after a hysterectomy (surgical removal of the uterus) when they have not had cancer.    Exceptions:  1) Yearly pap if HIV+ or immunosuppressed secondary to organ transplant  2) BELEN II-III continue routine screening for 20 years.    I encourage you continue looking for opportunities to choose a healthy lifestyle:       * Choose to eat a heart healthy diet. Check out the FOOD PLATE guidelines at: http://www.choosemyplate.gov/ for helpful hints on weight and cholesterol management.  Balance your caloric intake with exercise to maintain a BMI in the 22 to 26 range. For bone health: Eat calcium-rich foods like yogurt, broccoli or take chewable calcium pills (500 to 600 mg) twice a day with food.       *  Exercise for at least an average of 30 minutes a day, 5 days of the week. This will help you control your weight, release stress, and help prevent disease.      * Take a Vitamin D3 supplement daily fall through spring and during summer unless you nnip55-54' full body sun exposure to skin without sunscreen.      * DO wear sunscreen to prevent skin cancer after the first 15-30 minutes.      * Identify stressors in your life, find ways to release the stress, and, make time for yourself. PLEASE ask for help if mood changes last longer than two weeks.     * Limit alcohol to one drink per day.  No smoking.  Avoid second hand smoke. If you smoke, ask for help to stop.       *  If you are in a sexual relationship, talk with your partner about possible infection risks and take action to protect yourself from exposure to a sexual infection.    Please request an infection screen for STIs (sexually transmitted infections) if you are less than age 26 OR believe that you may be at risk.     Get a flu shot each year. Get a tetanus shot every 10 years. EVERYONE needs a pertussis (Whooping cough) booster.    See your dentist twice a year for an exam and preventive care cleaning.     Consider the following screen tests:    1) cholesterol test every 5 years.     2) yearly mammogram after age 40 unless you have identified risks.    3) colonoscopy every 10 years after age 50 unless you have identified risks.    4) diabetes blood test screening if you are at risk for diabetes.      Additional information that you may also find helpful:  The Internet now gives us access to LOTS of information -- some of it helpful, research documented and also plenty of harmful, anecdotal information that may not pertain to your situtaion. Consider visiting the following websites for accurate health information:    www.vitamindcouncil.org/ : Info and ongoing research re Vitamin D    www.fairview.org : Up to date and easily searchable information on  "multiple topics.    www.medlineplus.gov : medication info, interactive tutorials, watch real surgeries online    www.cdc.gov : public health info, travel advisories, epidemics (H1N1)    www.anabela/std.org: current research re diagnosis, treatment and prevention of sexually contacted infections.    www.health.state.[place].us : [place] dept of [facility], public health issues in [place], N1N1    www.familydoctor.org : good info from the Academy of Family Physicians                Follow-ups after your visit        Follow-up notes from your care team     Return in 1 year (on 7/27/2019) for Preventative Health Visit.      Who to contact     Please call your clinic at [phone] to:    Ask questions about your health    Make or cancel appointments    Discuss your medicines    Learn about your test results    Speak to your doctor            Additional Information About Your Visit        [employer] Information     [employer] gives you secure access to your electronic health record. If you see a primary care provider, you can also send messages to your care team and make appointments. If you have questions, please call your primary care clinic.  If you do not have a primary care provider, please call [phone] and they will assist you.      [employer]ova is an electronic gateway that provides easy, online access to your medical records. With [employer], you can request a clinic appointment, read your test results, renew a prescription or communicate with your care team.     To access your existing account, please contact your [facility] or call [phone] for assistance.        Care EveryWhere ID     This is your Care EveryWhere ID. This could be used by other organizations to access your [place] medical records  [id number]        Your Vitals Were     Pulse Height Last Period BMI (Body Mass Index)          69 1.626 m (5' 4\") 07/25/2018 18.28 kg/m2         Blood Pressure from Last 3 Encounters:   07/27/18 110/67 "   07/25/18 109/68   01/05/17 111/75    Weight from Last 3 Encounters:   07/27/18 48.3 kg (106 lb 8 oz)   07/25/18 48.5 kg (107 lb)   01/05/17 46.7 kg (103 lb)              We Performed the Following     HPV High Risk Types DNA Cervical     Pap imaged thin layer screen with HPV - recommended age 30 - 65 years (select HPV order below)     Pap Smear Exam [] Do Not Remove     Pelvic and Breast Exam Procedure []          Today's Medication Changes          These changes are accurate as of 7/27/18  8:34 AM.  If you have any questions, ask your nurse or doctor.               Stop taking these medicines if you haven't already. Please contact your care team if you have questions.     order for DME   Stopped by:  Janae Bustos APRN CNP                    Primary Care Provider Office Phone # Fax #    Cici Bradley [phone] [address] [place] [address]        Equal Access to Services     [facility]LA : Hadii timothy ku hadasho Soomaali, waaxda luqadaha, qaybta kaalmada adeegyada, waxay eve rader . So [facility] [phone].    ATENCIÓN: Si habla español, tiene a wells disposición servicios gratuitos de asistencia lingüística. Llame al [phone].    We comply with applicable federal civil rights laws and [place] laws. We do not discriminate on the basis of race, color, national origin, age, disability, sex, sexual orientation, or gender identity.            Thank you!     Thank you for choosing [facility]  for your care. Our goal is always to provide you with excellent care. Hearing back from our patients is one way we can continue to improve our services. Please take a few minutes to complete the written survey that you may receive in the mail after your visit with us. Thank you!             Your Updated Medication List - Protect others around you: Learn how to safely use, store and throw away your medicines at  www.disposemymeds.org.          This list is accurate as of 7/27/18  8:34 AM.  Always use your most recent med list.                   Brand Name Dispense Instructions for use Diagnosis    levonorgestrel 20 MCG/24HR IUD    MIRENA     1 each by Intrauterine route once Was inserted 9-2014        MULTI ADULT GUMMIES Chew

## 2018-07-31 LAB
COPATH REPORT: NORMAL
PAP: NORMAL

## 2018-08-01 LAB
FINAL DIAGNOSIS: NORMAL
HPV HR 12 DNA CVX QL NAA+PROBE: NEGATIVE
HPV16 DNA SPEC QL NAA+PROBE: NEGATIVE
HPV18 DNA SPEC QL NAA+PROBE: NEGATIVE
SPECIMEN DESCRIPTION: NORMAL
SPECIMEN SOURCE CVX/VAG CYTO: NORMAL

## 2018-08-10 ENCOUNTER — TRANSFERRED RECORDS (OUTPATIENT)
Dept: HEALTH INFORMATION MANAGEMENT | Facility: CLINIC | Age: 33
End: 2018-08-10

## 2019-07-09 ASSESSMENT — ANXIETY QUESTIONNAIRES
4. TROUBLE RELAXING: NOT AT ALL
3. WORRYING TOO MUCH ABOUT DIFFERENT THINGS: SEVERAL DAYS
1. FEELING NERVOUS, ANXIOUS, OR ON EDGE: NOT AT ALL
2. NOT BEING ABLE TO STOP OR CONTROL WORRYING: NOT AT ALL
7. FEELING AFRAID AS IF SOMETHING AWFUL MIGHT HAPPEN: NOT AT ALL
7. FEELING AFRAID AS IF SOMETHING AWFUL MIGHT HAPPEN: NOT AT ALL
6. BECOMING EASILY ANNOYED OR IRRITABLE: NOT AT ALL
GAD7 TOTAL SCORE: 1
5. BEING SO RESTLESS THAT IT IS HARD TO SIT STILL: NOT AT ALL
GAD7 TOTAL SCORE: 1

## 2019-07-10 ASSESSMENT — ANXIETY QUESTIONNAIRES: GAD7 TOTAL SCORE: 1

## 2019-07-12 ENCOUNTER — OFFICE VISIT (OUTPATIENT)
Dept: OBGYN | Facility: CLINIC | Age: 34
End: 2019-07-12
Attending: OBSTETRICS & GYNECOLOGY
Payer: COMMERCIAL

## 2019-07-12 VITALS
HEIGHT: 64 IN | SYSTOLIC BLOOD PRESSURE: 105 MMHG | DIASTOLIC BLOOD PRESSURE: 70 MMHG | HEART RATE: 71 BPM | BODY MASS INDEX: 18.61 KG/M2 | WEIGHT: 109 LBS

## 2019-07-12 DIAGNOSIS — Z00.00 VISIT FOR PREVENTIVE HEALTH EXAMINATION: Primary | ICD-10-CM

## 2019-07-12 DIAGNOSIS — Z30.430 ENCOUNTER FOR INSERTION OF INTRAUTERINE CONTRACEPTIVE DEVICE: ICD-10-CM

## 2019-07-12 DIAGNOSIS — E04.9 ENLARGED THYROID: ICD-10-CM

## 2019-07-12 LAB — TSH SERPL DL<=0.005 MIU/L-ACNC: 2.18 MU/L (ref 0.4–4)

## 2019-07-12 PROCEDURE — 58300 INSERT INTRAUTERINE DEVICE: CPT | Mod: 52,ZF | Performed by: OBSTETRICS & GYNECOLOGY

## 2019-07-12 PROCEDURE — 36415 COLL VENOUS BLD VENIPUNCTURE: CPT | Performed by: OBSTETRICS & GYNECOLOGY

## 2019-07-12 PROCEDURE — 58301 REMOVE INTRAUTERINE DEVICE: CPT | Mod: ZF | Performed by: OBSTETRICS & GYNECOLOGY

## 2019-07-12 PROCEDURE — 84443 ASSAY THYROID STIM HORMONE: CPT | Performed by: OBSTETRICS & GYNECOLOGY

## 2019-07-12 PROCEDURE — G0463 HOSPITAL OUTPT CLINIC VISIT: HCPCS | Mod: ZF

## 2019-07-12 RX ORDER — ETONOGESTREL AND ETHINYL ESTRADIOL VAGINAL RING .015; .12 MG/D; MG/D
1 RING VAGINAL
Qty: 3 EACH | Refills: 3 | Status: SHIPPED | OUTPATIENT
Start: 2019-07-12 | End: 2023-12-08

## 2019-07-12 ASSESSMENT — MIFFLIN-ST. JEOR: SCORE: 1184.42

## 2019-07-12 ASSESSMENT — ENCOUNTER SYMPTOMS
INSOMNIA: 0
NERVOUS/ANXIOUS: 0

## 2019-07-12 ASSESSMENT — PAIN SCALES - GENERAL: PAINLEVEL: NO PAIN (0)

## 2019-07-12 NOTE — PATIENT INSTRUCTIONS

## 2019-07-12 NOTE — LETTER
2019       RE: Jane Hernandez  [address]  [place] [address]     Dear Colleague,    Thank you for referring your patient, Jane Hernandez, to the [facility] at [facility]. Please see a copy of my visit note below.    IUD PROCEDURE NOTE  2019      Jane Hernandez is an 33 year old, , Who requests IUD removal and reinsertion.  She had her IUD placed in [place], She thinks 2014; this is her 2nd IUD.  She spots for weeks at a time; but this doesn't bother her. She is in a committed relationship for 4 years. She states that her prior IUD removal was very painful for her.    She is not sure if they will have a family or not.  She is very teary when she talks about this.    Consent:  Risks, benefits of treatment, and no treatment were discussed.  Patient's questions were elicited and answered.  and Written consent signed and scanned into medical record.    The cervix was visualized with a medium Tabatha speculum.  The cervix was prepped with Betadine solution.  The IUD string was barely at the external cervix.  This was grasped with ring and required 4 pulls to removed the IUD. I had concerns that this was embedded in her uterus. Her bimanual exam revealed a very small AF uterus and I had concerns that a mirena would be too large and I did  her on Kyleena which she agreed.  Local injected at the tenaculum site and tenaculum placed.  The sound was quite tight and os finder was used to dilate the canal.  The uterus was sounded to 5 cm.  The Kyleena IUD insertion apparatus was prepared and placed through the cervix without significant resistance and deployed however was withdrawn with removal of the introducer.  A second kyleena was used with better deployment however the stem of the IUD was visualized at the external os and this was removed.    Tolerance of Procedure:  Patient did tolerate the procedure well in view of all that was  done.       A/P: 33 year old here today for IUD removal and failed attempt at insertion. Brittany failed x2. If she would at all consider another IUD in the future I would recommend U/S measurement of uterus beforehand. She is willing to start nuvaring today.    Again, thank you for allowing me to participate in the care of your patient.      Sincerely,    Jules Hussein MD

## 2019-07-12 NOTE — PROGRESS NOTES
IUD PROCEDURE NOTE  2019      Jane Hernandez is an 33 year old, , Who requests IUD removal and reinsertion.  She had her IUD placed in [place], She thinks 2014; this is her 2nd IUD.  She spots for weeks at a time; but this doesn't bother her. She is in a committed relationship for 4 years. She states that her prior IUD removal was very painful for her.    She is not sure if they will have a family or not.  She is very teary when she talks about this.    Consent:  Risks, benefits of treatment, and no treatment were discussed.  Patient's questions were elicited and answered.  and Written consent signed and scanned into medical record.    The cervix was visualized with a medium Tabatha speculum.  The cervix was prepped with Betadine solution.  The IUD string was barely at the external cervix.  This was grasped with ring and required 4 pulls to removed the IUD. I had concerns that this was embedded in her uterus. Her bimanual exam revealed a very small AF uterus and I had concerns that a mirena would be too large and I did  her on Kyleena which she agreed.  Local injected at the tenaculum site and tenaculum placed.  The sound was quite tight and os finder was used to dilate the canal.  The uterus was sounded to 5 cm.  The Kyleena IUD insertion apparatus was prepared and placed through the cervix without significant resistance and deployed however was withdrawn with removal of the introducer.  A second kyleena was used with better deployment however the stem of the IUD was visualized at the external os and this was removed.    Tolerance of Procedure:  Patient did tolerate the procedure well in view of all that was done.       A/P: 33 year old here today for IUD removal and failed attempt at insertion. Kyleena failed x2. If she would at all consider another IUD in the future I would recommend U/S measurement of uterus beforehand. She is willing to start nuvaring today.

## 2019-10-25 ENCOUNTER — TRANSFERRED RECORDS (OUTPATIENT)
Dept: HEALTH INFORMATION MANAGEMENT | Facility: CLINIC | Age: 34
End: 2019-10-25

## 2019-11-04 ENCOUNTER — HEALTH MAINTENANCE LETTER (OUTPATIENT)
Age: 34
End: 2019-11-04

## 2019-12-18 ENCOUNTER — HOSPITAL ENCOUNTER (OUTPATIENT)
Dept: MRI IMAGING | Facility: CLINIC | Age: 34
Discharge: HOME | End: 2019-12-18
Attending: OBSTETRICS & GYNECOLOGY | Admitting: OBSTETRICS & GYNECOLOGY
Payer: COMMERCIAL

## 2019-12-18 DIAGNOSIS — R79.89 ELEVATED PROLACTIN LEVEL: ICD-10-CM

## 2019-12-18 PROCEDURE — 70553 MRI BRAIN STEM W/O & W/DYE: CPT

## 2019-12-18 PROCEDURE — 25500064 ZZH RX 255 OP 636: Performed by: OBSTETRICS & GYNECOLOGY

## 2019-12-18 PROCEDURE — A9585 GADOBUTROL INJECTION: HCPCS | Performed by: OBSTETRICS & GYNECOLOGY

## 2019-12-18 RX ORDER — GADOBUTROL 604.72 MG/ML
5 INJECTION INTRAVENOUS ONCE
Status: COMPLETED | OUTPATIENT
Start: 2019-12-18 | End: 2019-12-18

## 2019-12-18 RX ADMIN — GADOBUTROL 5 ML: 604.72 INJECTION INTRAVENOUS at 07:50

## 2020-11-22 ENCOUNTER — HEALTH MAINTENANCE LETTER (OUTPATIENT)
Age: 35
End: 2020-11-22

## 2021-04-21 ENCOUNTER — THERAPY VISIT (OUTPATIENT)
Dept: OCCUPATIONAL THERAPY | Facility: CLINIC | Age: 36
End: 2021-04-21
Payer: COMMERCIAL

## 2021-04-21 DIAGNOSIS — M25.532 LEFT WRIST PAIN: ICD-10-CM

## 2021-04-21 PROCEDURE — 97165 OT EVAL LOW COMPLEX 30 MIN: CPT | Mod: GO | Performed by: OCCUPATIONAL THERAPIST

## 2021-04-21 PROCEDURE — 97110 THERAPEUTIC EXERCISES: CPT | Mod: GO | Performed by: OCCUPATIONAL THERAPIST

## 2021-04-21 PROCEDURE — 97530 THERAPEUTIC ACTIVITIES: CPT | Mod: GO | Performed by: OCCUPATIONAL THERAPIST

## 2021-04-21 NOTE — PROGRESS NOTES
Hand Therapy Initial Evaluation  Current Date:  4/21/2021    Subjective:  Jane Hernandez is a 35 year old left hand dominant female.    Diagnosis: L wrist pain  DOI:  10/19/19    Patient reports symptoms of pain of the left wrist which occurred due to overuse. Since onset symptoms are unchanged. Special tests:  x-ray.  Previous treatment: activity modification.     Answers for HPI/ROS submitted by the patient on 4/19/2021   History Reported by Patient  Reason for Visit:: Chronic left wrist pain  When problem began:: 10/19/2019  How problem occurred:: Exercise/overuse injury  Number scale: 1/10  General health as reported by patient: excellent  Medical allergies: none  Surgeries: other  Other Surgery Detail: Tonsillectomy  Medications you are currently taking: none  Occupation:: Physical Therapist  What are your primary job tasks: computer work, lifting/carrying, prolonged standing, repetitive tasks  Other Tasks Detail: Pain with wrist extension - wrist extension during work assessing VOR/DVA assessment (almost every day/milt times a day but only 1 min at a time)    Occupational Profile Information:  Current occupation is physical therapist  Prior functional level:  no limitations  Barriers include:none  Mobility: No difficulty  Transportation: drives  Leisure activities/hobbies: yoga    Upper Extremity Functional Index Score:  SCORE:   Column Totals: /80: (P) 76   (A lower score indicates greater disability.)    Objective:  Pain Level (Scale 0-10):   4/21/2021   At Rest 0-1/10   With Use 6/10     Pain Description:  Date 4/21/2021   Location L dorsal radial wrist   Pain Quality Sharp   Frequency intermittent     Pain is worst  daytime or nighttime   Exacerbated by  end range weight bearing   Relieved by Activity modification   Progression unchanging     Sensation  WNL throughout all nerve distributions; per patient report    ROM  Per visual report, wrist AROM reported as WNL.    Central Dorsal Zone: (+ for  hypermobile or - for hypomobile) Pain 0-10/10   4/21/2021    L   Finger Extension Test (SL--in wrist flex, resist long ext) -, pain present   Louis Test (SL) -   Linscheid Test (CMC joints--stabilize distal MC, mob CMC) WNL   Ballottement Scaphoid on Lunte Decreased dorsal glide of the scaphoid     Strength   (Measured in pounds)  Pain Report:  scale of 0-10/10   4/21/2021 4/21/2021   Trials R L   1  2  3 47 56   Average 47 56     Lat Pinch 4/21/2021 4/21/2021   Trials R L   1  2  3 12 14   Average 12 14     3 Pt Pinch 4/21/2021 4/21/2021   Trials R L   1  2  3 12 12   Average 12 12     Assessment:  Patient presents with symptoms consistent with diagnosis of left wrist pain, with conservative intervention.     Patient's limitations or Problem List includes:  Pain, Decreased ROM/motion, Weakness, Decreased  and Tightness in musculature of the left wrist which interferes with the patient's ability to perform Work Tasks and Recreational Activities as compared to previous level of function.    Rehab Potential:  Excellent - Return to full activity, no limitations    Patient will benefit from skilled Occupational Therapy to increase ROM, wrist stability,  strength and forearm strength and decrease pain to return to previous activity level and resume normal daily tasks and to reach their rehab potential.    Barriers to Learning:  No barrier    Communication Issues:  Patient appears to be able to clearly communicate and understand verbal and written communication and follow directions correctly.    Chart Review: Chart Review and Simple history review with patient    Assessment of Occupational Performance:  1-3 Performance Deficits  Identified Performance Deficits: bathing/showering, health management and maintenance, home establishment and management, work and leisure activities      Clinical Decision Making (Complexity): Low complexity    Treatment Explanation:  The following has been discussed with the  patient:    RX ordered/plan of care  Anticipated outcomes  Possible risks and side effects    P: Frequency:  1 X every other week, once daily  Duration:  for 8 weeks    Treatment Plan:    Modalities:  none   Therapeutic Exercise: AROM of wrist, PROM with stretch to wrist extensors and flexors,  Wrist stability program, isometrics  Neuromuscular Techniques: Closed chain exercises, proprioception  Manual Techniques:, Myofascial release of the forearm extensors and flexors, wrist mobilization techniques  Orthosis:  Wrist restore    Home Program:   Wrist Stabilization Door Frame Pull Backs   EMR Notes   HEP -  Sets   Reps   Sessions per day   Notes Work up to 100 repetitions over the course of the day keeping wrist in netural   Wrist Stabilization Wall Push Ups on Fists   EMR Notes   HEP -  Sets   Reps   Sessions per day   Notes Work up to 100 repetitions over the course of the day keeping wrists in neutral   Wrist Stabilization Wall Push Ups on Fingertips   EMR Notes   HEP -  Sets   Reps   Sessions per day   Notes Work up to 100 repetitions over the course of the day keeping wrist in netural   Wrist restore prn during the day    Discharge Plan:    Achieve all LTG.  Independent in home treatment program.  Reach maximal therapeutic benefit.    Next Visit:  Review HEP  Dorsal scaphoid mob  Progress HEP  MFR to flexors/extensors

## 2021-04-22 PROBLEM — M25.532 LEFT WRIST PAIN: Status: ACTIVE | Noted: 2021-04-22

## 2021-05-21 ENCOUNTER — THERAPY VISIT (OUTPATIENT)
Dept: OCCUPATIONAL THERAPY | Facility: CLINIC | Age: 36
End: 2021-05-21
Payer: COMMERCIAL

## 2021-05-21 DIAGNOSIS — M25.532 LEFT WRIST PAIN: ICD-10-CM

## 2021-05-21 PROCEDURE — 97110 THERAPEUTIC EXERCISES: CPT | Mod: GO | Performed by: OCCUPATIONAL THERAPIST

## 2021-05-21 PROCEDURE — 97140 MANUAL THERAPY 1/> REGIONS: CPT | Mod: GO | Performed by: OCCUPATIONAL THERAPIST

## 2021-06-04 ENCOUNTER — THERAPY VISIT (OUTPATIENT)
Dept: OCCUPATIONAL THERAPY | Facility: CLINIC | Age: 36
End: 2021-06-04
Payer: COMMERCIAL

## 2021-06-04 DIAGNOSIS — M25.532 LEFT WRIST PAIN: ICD-10-CM

## 2021-06-04 PROCEDURE — 97110 THERAPEUTIC EXERCISES: CPT | Mod: GO | Performed by: OCCUPATIONAL THERAPIST

## 2021-06-04 PROCEDURE — 97140 MANUAL THERAPY 1/> REGIONS: CPT | Mod: GO | Performed by: OCCUPATIONAL THERAPIST

## 2021-08-02 PROBLEM — M25.532 LEFT WRIST PAIN: Status: RESOLVED | Noted: 2021-04-22 | Resolved: 2021-08-02

## 2021-09-19 ENCOUNTER — HEALTH MAINTENANCE LETTER (OUTPATIENT)
Age: 36
End: 2021-09-19

## 2022-01-08 ENCOUNTER — HEALTH MAINTENANCE LETTER (OUTPATIENT)
Age: 37
End: 2022-01-08

## 2022-02-17 PROBLEM — Z78.9 USES BIRTH CONTROL: Status: ACTIVE | Noted: 2017-01-20

## 2022-11-20 ENCOUNTER — HEALTH MAINTENANCE LETTER (OUTPATIENT)
Age: 37
End: 2022-11-20

## 2023-04-15 ENCOUNTER — HEALTH MAINTENANCE LETTER (OUTPATIENT)
Age: 38
End: 2023-04-15

## 2023-05-21 ASSESSMENT — ACTIVITIES OF DAILY LIVING (ADL)
STAND: ACTIVITY IS NOT DIFFICULT
GO DOWN STAIRS: ACTIVITY IS NOT DIFFICULT
RISE FROM A CHAIR: ACTIVITY IS MINIMALLY DIFFICULT
HOW_WOULD_YOU_RATE_THE_CURRENT_FUNCTION_OF_YOUR_KNEE_DURING_YOUR_USUAL_DAILY_ACTIVITIES_ON_A_SCALE_FROM_0_TO_100_WITH_100_BEING_YOUR_LEVEL_OF_KNEE_FUNCTION_PRIOR_TO_YOUR_INJURY_AND_0_BEING_THE_INABILITY_TO_PERFORM_ANY_OF_YOUR_USUAL_DAILY_ACTIVITIES?: 95
GO DOWN STAIRS: ACTIVITY IS NOT DIFFICULT
GIVING WAY, BUCKLING OR SHIFTING OF KNEE: I HAVE THE SYMPTOM BUT IT DOES NOT AFFECT MY ACTIVITY
STIFFNESS: I DO NOT HAVE THE SYMPTOM
SIT WITH YOUR KNEE BENT: ACTIVITY IS NOT DIFFICULT
GIVING WAY, BUCKLING OR SHIFTING OF KNEE: I HAVE THE SYMPTOM BUT IT DOES NOT AFFECT MY ACTIVITY
HOW_WOULD_YOU_RATE_THE_CURRENT_FUNCTION_OF_YOUR_KNEE_DURING_YOUR_USUAL_DAILY_ACTIVITIES_ON_A_SCALE_FROM_0_TO_100_WITH_100_BEING_YOUR_LEVEL_OF_KNEE_FUNCTION_PRIOR_TO_YOUR_INJURY_AND_0_BEING_THE_INABILITY_TO_PERFORM_ANY_OF_YOUR_USUAL_DAILY_ACTIVITIES?: 95
KNEE_ACTIVITY_OF_DAILY_LIVING_SCORE: 88.57
STIFFNESS: I DO NOT HAVE THE SYMPTOM
AS_A_RESULT_OF_YOUR_KNEE_INJURY,_HOW_WOULD_YOU_RATE_YOUR_CURRENT_LEVEL_OF_DAILY_ACTIVITY?: NORMAL
WEAKNESS: I HAVE THE SYMPTOM BUT IT DOES NOT AFFECT MY ACTIVITY
KNEEL ON THE FRONT OF YOUR KNEE: ACTIVITY IS NOT DIFFICULT
GO UP STAIRS: ACTIVITY IS MINIMALLY DIFFICULT
GO UP STAIRS: ACTIVITY IS MINIMALLY DIFFICULT
RAW_SCORE: 62
WALK: ACTIVITY IS NOT DIFFICULT
HOW_WOULD_YOU_RATE_THE_OVERALL_FUNCTION_OF_YOUR_KNEE_DURING_YOUR_USUAL_DAILY_ACTIVITIES?: NEARLY NORMAL
KNEE_ACTIVITY_OF_DAILY_LIVING_SUM: 62
RISE FROM A CHAIR: ACTIVITY IS MINIMALLY DIFFICULT
LIMPING: I DO NOT HAVE THE SYMPTOM
SQUAT: ACTIVITY IS SOMEWHAT DIFFICULT
SIT WITH YOUR KNEE BENT: ACTIVITY IS NOT DIFFICULT
WEAKNESS: I HAVE THE SYMPTOM BUT IT DOES NOT AFFECT MY ACTIVITY
STAND: ACTIVITY IS NOT DIFFICULT
KNEEL ON THE FRONT OF YOUR KNEE: ACTIVITY IS NOT DIFFICULT
SWELLING: I DO NOT HAVE THE SYMPTOM
PAIN: THE SYMPTOM AFFECTS MY ACTIVITY SLIGHTLY
AS_A_RESULT_OF_YOUR_KNEE_INJURY,_HOW_WOULD_YOU_RATE_YOUR_CURRENT_LEVEL_OF_DAILY_ACTIVITY?: NORMAL
PLEASE_INDICATE_YOR_PRIMARY_REASON_FOR_REFERRAL_TO_THERAPY:: KNEE
SWELLING: I DO NOT HAVE THE SYMPTOM
SQUAT: ACTIVITY IS SOMEWHAT DIFFICULT
LIMPING: I DO NOT HAVE THE SYMPTOM
HOW_WOULD_YOU_RATE_THE_OVERALL_FUNCTION_OF_YOUR_KNEE_DURING_YOUR_USUAL_DAILY_ACTIVITIES?: NEARLY NORMAL
PAIN: THE SYMPTOM AFFECTS MY ACTIVITY SLIGHTLY
WALK: ACTIVITY IS NOT DIFFICULT

## 2023-05-22 ENCOUNTER — THERAPY VISIT (OUTPATIENT)
Dept: PHYSICAL THERAPY | Facility: CLINIC | Age: 38
End: 2023-05-22
Payer: COMMERCIAL

## 2023-05-22 DIAGNOSIS — M22.2X2 PATELLOFEMORAL PAIN SYNDROME OF LEFT KNEE: ICD-10-CM

## 2023-05-22 PROCEDURE — 97110 THERAPEUTIC EXERCISES: CPT | Mod: GP | Performed by: PHYSICAL THERAPIST

## 2023-05-22 PROCEDURE — 97161 PT EVAL LOW COMPLEX 20 MIN: CPT | Mod: GP | Performed by: PHYSICAL THERAPIST

## 2023-05-22 NOTE — PROGRESS NOTES
PHYSICAL THERAPY EVALUATION  Type of Visit: Evaluation    See electronic medical record for Abuse and Falls Screening details.    Subjective      Presenting condition or subjective complaint: Left knee pain since 2017. Initial onset during exercise: one of my first Pure[employer] exercises. Pain began following a  waterski-like  position - holding ballet bar, standing on toes, leaning back, significant knee flexion.  Date of onset:      Relevant medical history:     Dates & types of surgery:      Prior diagnostic imaging/testing results: MRI; X-ray     Prior therapy history for the same diagnosis, illness or injury: Yes Physical therapy - [facility] x4 visits 01/2020-03/2020. Exercises incuded: 6 postural restoration exs for HEP (R hip stretch, L hip and core strength), manual therapy L hip, dry needling L hip - no significant change.    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  Hobbies/Interests: Interested in resuming PureBarre in August for exercise, current exercise: [place]- biking, 15 min strength routines; yoga, taking my dog for walks    Patient goals for therapy: Lunge, squat, get on/off toliet, climb stairs (weightbaring knee flexion) w/o knee pain    Pain assessment: Pain present  Location: left knee anterior/Rating: 3/10 ; worst  5/10     Objective     KNEE:    Standing Posture: WNL        SL Balance:   L: 30 sec, more challenge (R hip drop)  R: 30 sec (L hip drop)      Functional:   - Squat: good form (no pain)  - SL Squat: valgus bilaterally,  more challenged on left      Swelling: none        AROM: (* indicates patient's pain)   PROM:(over pressure)   L  R   Hyperextension   5 5   Extension   0 0   Flexion   135 135     Strength:   L R   HIP     Flex 4/5 4/5   Ext 3+/5 4+/5   ABd 3+/5 4+/5   KNEE     QS: good bilaterally    Hip PROM:     L R   Flexion 120 (pinch) 120   Extension     IR 60 60   ER 40 40         Palpation: no pain with palpation over quad tendon and patellar tendon    Patellar tracking: lateral tilt on left          Assessment & Plan   CLINICAL IMPRESSIONS   Medical Diagnosis: L knee pain; L hip pain    Treatment Diagnosis: patellofemoral pain of the left knee left patellofemoral pain   Impression/Assessment: Patient is a 37 year old female with left knee and hip complaints.  The following significant findings have been identified: Pain, Decreased strength, Impaired balance, Decreased proprioception, Inflammation, Impaired muscle performance and Instability. These impairments interfere with their ability to perform self care tasks, work tasks, recreational activities and household chores as compared to previous level of function.     Clinical Decision Making (Complexity):   Clinical Presentation: Stable/Uncomplicated  Clinical Presentation Rationale: based on medical and personal factors listed in PT evaluation  Clinical Decision Making (Complexity): Low complexity    PLAN OF CARE  Treatment Interventions:  Interventions: Neuromuscular Re-education, Therapeutic Activity, Therapeutic Exercise    Long Term Goals     PT Goal 1  Goal Identifier: goal 1  Goal Description: pt will be able to go up and down stairs, with no railing, painfree, for home ambulation  Rationale: to maximize safety and independence within the home  Target Date: 07/03/23      Frequency of Treatment: 1X/week  Duration of Treatment: 6 weeks      Education Assessment:   Learner/Method: Patient    Risks and benefits of evaluation/treatment have been explained.    Patient/Family/caregiver agrees with Plan of Care.     Evaluation Time:     PT Earnestine, Low Complexity Minutes (23104): 20      Signing Clinician: Namrata Johansen PT

## 2023-06-02 ENCOUNTER — THERAPY VISIT (OUTPATIENT)
Dept: PHYSICAL THERAPY | Facility: CLINIC | Age: 38
End: 2023-06-02
Payer: COMMERCIAL

## 2023-06-02 DIAGNOSIS — M22.2X2 PATELLOFEMORAL PAIN SYNDROME OF LEFT KNEE: Primary | ICD-10-CM

## 2023-06-02 PROCEDURE — 97530 THERAPEUTIC ACTIVITIES: CPT | Mod: GP | Performed by: PHYSICAL THERAPIST

## 2023-06-02 PROCEDURE — 97110 THERAPEUTIC EXERCISES: CPT | Mod: GP | Performed by: PHYSICAL THERAPIST

## 2023-07-06 PROBLEM — M22.2X2 PATELLOFEMORAL PAIN SYNDROME OF LEFT KNEE: Status: RESOLVED | Noted: 2023-05-22 | Resolved: 2023-07-06

## 2023-11-28 NOTE — TELEPHONE ENCOUNTER
FUTURE VISIT INFORMATION      FUTURE VISIT INFORMATION:  Date: 12/8/23  Time: 2:40pm  Location: [facility]  REFERRAL INFORMATION:  Referring provider:  Jeanna Rossi NP  Referring providers clinic:    Reason for visit/diagnosis  Chronic Pansinusitis. Referred by Jeanna Rossi NP. Appt per pt. CSC confirmed. Pt is having referral faxed (it is in her personal posession at this time)     RECORDS REQUESTED FROM:       Clinic name Comments Records Status Imaging Status   [facility]  11/28/23- OV trish Rossi NP  4/18/16- Ov Linda Jordan NP   Care everywhere     Imaging  12/18/19- MR Brain  Epic  PACS            November 28, 2023 1:12 PM Called Jane and left message to view [facility] CE chart - Amy   November 28, 2023 2:28 PM- Received a called back from Jane and she allow me to view her chart on CE- Amy

## 2023-12-04 ENCOUNTER — TRANSCRIBE ORDERS (OUTPATIENT)
Dept: OTHER | Age: 38
End: 2023-12-04

## 2023-12-04 DIAGNOSIS — J32.9 CHRONIC RHINOSINUSITIS: Primary | ICD-10-CM

## 2023-12-08 ENCOUNTER — OFFICE VISIT (OUTPATIENT)
Dept: OTOLARYNGOLOGY | Facility: CLINIC | Age: 38
End: 2023-12-08
Attending: STUDENT IN AN ORGANIZED HEALTH CARE EDUCATION/TRAINING PROGRAM
Payer: COMMERCIAL

## 2023-12-08 ENCOUNTER — PRE VISIT (OUTPATIENT)
Dept: OTOLARYNGOLOGY | Facility: CLINIC | Age: 38
End: 2023-12-08

## 2023-12-08 VITALS
BODY MASS INDEX: 18.45 KG/M2 | WEIGHT: 108.1 LBS | DIASTOLIC BLOOD PRESSURE: 81 MMHG | HEART RATE: 90 BPM | HEIGHT: 64 IN | SYSTOLIC BLOOD PRESSURE: 113 MMHG | OXYGEN SATURATION: 97 %

## 2023-12-08 DIAGNOSIS — R44.8 FACIAL PRESSURE: Primary | ICD-10-CM

## 2023-12-08 PROCEDURE — 31231 NASAL ENDOSCOPY DX: CPT | Performed by: STUDENT IN AN ORGANIZED HEALTH CARE EDUCATION/TRAINING PROGRAM

## 2023-12-08 PROCEDURE — 99203 OFFICE O/P NEW LOW 30 MIN: CPT | Mod: 25 | Performed by: STUDENT IN AN ORGANIZED HEALTH CARE EDUCATION/TRAINING PROGRAM

## 2023-12-08 RX ORDER — METHYLPREDNISOLONE 4 MG
4 TABLET, DOSE PACK ORAL 2 TIMES DAILY
COMMUNITY
Start: 2023-11-28 | End: 2023-12-02

## 2023-12-08 ASSESSMENT — PAIN SCALES - GENERAL: PAINLEVEL: NO PAIN (1)

## 2023-12-08 NOTE — PATIENT INSTRUCTIONS
1. You were seen in the ENT Clinic today by Dr. Pride.  If you have any questions or concerns after your appointment, please call   - Option 1: ENT Clinic: [phone]   - Option 2: Johanne (Dr. Pride's Nurse): [phone]                  Sung (Dr. Pride's Nurse): [phone]    2.   message us in 2 weeks to see how you are doing    3. Prescriptions sent to the pharmacy       How to Contact Us:  Send a [employer] message to your provider. Our team will respond to you via [employer]. Occasionally, we will need to call you to get further information.  For urgent matters (Monday-Friday), call the ENT Clinic: [phone] and speak with a call center team member - they will route your call appropriately.   If you'd like to speak directly with a nurse, please find our contact information below. We do our best to check voicemail frequently throughout the day, and will work to call you back within 1-2 days. For urgent matters, please use the general clinic phone numbers listed above.       Johanne Keys LPN  [facility] - Otolaryngology

## 2023-12-08 NOTE — PROGRESS NOTES
[facility]  New Patient Visit      Encounter date:   December 8, 2023    Referring Provider:   No referring provider defined for this encounter.    Chief Complaint: facial pressure    History of Present Illness:   Jane Hernandez is a 38 year old female who presents for consultation regarding persistent facial pressure.    Associated with bimax/bieth/bifrontal facial pressure  Now has difficulty breathing through her nose  Feels that L > R with congestion  Has been on decongestants, steroid taper (6 day taper), and flonase (once daily) - has helped somewhat  Potentially some associated brain fog    Has not had allergies in the past    She is a vestibular physical therapist    Review of systems: A 14-point review of systems has been conducted and was negative for any notable symptoms, except as dictated in the history of present illness.     Medical History:  Past Medical History:   Diagnosis Date    Allergic state         Surgical History:   Past Surgical History:   Procedure Laterality Date    TONSILLECTOMY  2004        Family History:  Family History   Problem Relation Age of Onset    Thyroid Disease Mother         Hypothyroid    Hypertension Mother     Hyperlipidemia Mother     Melanoma Father     Prostate Cancer Father     Other Cancer Father         Melanoma        Social History:   Social History     Socioeconomic History    Marital status: Single     Spouse name: None    Number of children: None    Years of education: None    Highest education level: None   Tobacco Use    Smoking status: Never    Smokeless tobacco: Never   Substance and Sexual Activity    Alcohol use: Yes     Alcohol/week: 0.0 standard drinks of alcohol     Comment: occasional    Drug use: No    Sexual activity: Yes     Partners: Male     Birth control/protection: Pull-out method, I.U.D.   Social History Narrative    How much exercise per week? 2-3 days    How much calcium per day? In foods and multi vits       How much caffeine  "per day? 1 coofee    How much vitamin D per day? In mutli vit s and foods    Do you/your family wear seatbelts?  Yes    Do you/your family use safety helmets? Yes    Do you/your family use sunscreen? Yes    Do you/your family keep firearms in the home? No    Do you/your family have a smoke detector(s)? Yes        Reviewed chris [facility] 7-            Physical Exam:  Vital signs: /81 (BP Location: Left arm, Patient Position: Sitting, Cuff Size: Adult Small)   Pulse 90   Ht 1.626 m (5' 4\")   Wt 49 kg (108 lb 1.6 oz)   SpO2 97%   BMI 18.56 kg/m     General Appearance: No acute distress, appropriate demeanor, conversant  Eyes: moist conjunctivae; EOMI; pupils symmetric; visual acuity grossly intact; no proptosis  Head: normocephalic; overall symmetric appearance without deformity  Face: overall symmetric without deformity;   Ears: Normal appearance of external ear; external meatus normal in appearance; TMs intact without perforation bilaterally;   Nose: No external deformity; septum deviated to the right  Oral Cavity/oropharynx: Normal appearance of mucosa; tongue midline; no mass or lesions; oropharynx without obvious mucosal abnormality  Neck: no palpable lymphadenopathy; thyroid without palpable nodules  Lungs: symmetric chest rise; no wheezing  CV: Good distal perfusion; normal hear rate  Extremities: No deformity  Neurologic Exam: Cranial nerves II-XII are grossly intact; no focal deficit    Procedure Note  Procedure performed: Rigid nasal endoscopy  Indication: To evaluate for sinonasal pathology not visualized on routine anterior rhinoscopy  Anesthesia: 4% topical lidocaine with 0.05% oxymetazoline  Description of procedure: A 30 degree, 3 mm rigid endoscope was inserted into bilateral nasal cavities and the nasal valves, nasal cavity, middle meatus, sphenoethmoid recess, and nasopharynx were thoroughly evaluated for evidence of obstruction, edema, purulence, polyps and/or mass/lesion. "     Findings:  MM clear bilaterally, no pus or polyp  DNS to the right  IT non hypertrophic    The patient tolerated the procedure well without complication.         Assessment, Decision Making, and Plan:  (R44.8) Facial pressure  (primary encounter diagnosis)  Comment:   --differential includes allergy, sinusitis (not visualized on endoscopy), or possible atypical migraine  Plan:   --we will do empiric allergy therapy (OTC antihistamine, saline rinse, and flonase)  --if no improvement will consider CT in 2-3 weeks to rule out any anatomic issues or sinusitis that cannot be visualized on endoscopy  --will also keep atypical migraine with facial pain on the differential  --she will message us on mychart in 2 weeks      Zen Pride MD    [facility]  Rhinology  Endoscopic Skull Base Surgery  [facility]  Department of Otolaryngology - Head & Neck Surgery

## 2023-12-08 NOTE — LETTER
12/8/2023       RE: Jane Hernandez  [address]  [place] [address]     Dear Colleague,    Thank you for referring your patient, Jane Hernandez, to the [facility] [place] at [facility]. Please see a copy of my visit note below.      [facility]  New Patient Visit      Encounter date:   December 8, 2023    Referring Provider:   No referring provider defined for this encounter.    Chief Complaint: facial pressure    History of Present Illness:   Jane Hernandez is a 38 year old female who presents for consultation regarding persistent facial pressure.    Associated with bimax/bieth/bifrontal facial pressure  Now has difficulty breathing through her nose  Feels that L > R with congestion  Has been on decongestants, steroid taper (6 day taper), and flonase (once daily) - has helped somewhat  Potentially some associated brain fog    Has not had allergies in the past    She is a vestibular physical therapist    Review of systems: A 14-point review of systems has been conducted and was negative for any notable symptoms, except as dictated in the history of present illness.     Medical History:  Past Medical History:   Diagnosis Date     Allergic state         Surgical History:   Past Surgical History:   Procedure Laterality Date     TONSILLECTOMY  2004        Family History:  Family History   Problem Relation Age of Onset     Thyroid Disease Mother         Hypothyroid     Hypertension Mother      Hyperlipidemia Mother      Melanoma Father      Prostate Cancer Father      Other Cancer Father         Melanoma        Social History:   Social History     Socioeconomic History     Marital status: Single     Spouse name: None     Number of children: None     Years of education: None     Highest education level: None   Tobacco Use     Smoking status: Never     Smokeless tobacco: Never   Substance and Sexual Activity     Alcohol use:  "Yes     Alcohol/week: 0.0 standard drinks of alcohol     Comment: occasional     Drug use: No     Sexual activity: Yes     Partners: Male     Birth control/protection: Pull-out method, I.U.D.   Social History Narrative    How much exercise per week? 2-3 days    How much calcium per day? In foods and multi vits       How much caffeine per day? 1 coofee    How much vitamin D per day? In mutli vit s and foods    Do you/your family wear seatbelts?  Yes    Do you/your family use safety helmets? Yes    Do you/your family use sunscreen? Yes    Do you/your family keep firearms in the home? No    Do you/your family have a smoke detector(s)? Yes        Reviewed [facility] 7-            Physical Exam:  Vital signs: /81 (BP Location: Left arm, Patient Position: Sitting, Cuff Size: Adult Small)   Pulse 90   Ht 1.626 m (5' 4\")   Wt 49 kg (108 lb 1.6 oz)   SpO2 97%   BMI 18.56 kg/m     General Appearance: No acute distress, appropriate demeanor, conversant  Eyes: moist conjunctivae; EOMI; pupils symmetric; visual acuity grossly intact; no proptosis  Head: normocephalic; overall symmetric appearance without deformity  Face: overall symmetric without deformity;   Ears: Normal appearance of external ear; external meatus normal in appearance; TMs intact without perforation bilaterally;   Nose: No external deformity; septum deviated to the right  Oral Cavity/oropharynx: Normal appearance of mucosa; tongue midline; no mass or lesions; oropharynx without obvious mucosal abnormality  Neck: no palpable lymphadenopathy; thyroid without palpable nodules  Lungs: symmetric chest rise; no wheezing  CV: Good distal perfusion; normal hear rate  Extremities: No deformity  Neurologic Exam: Cranial nerves II-XII are grossly intact; no focal deficit    Procedure Note  Procedure performed: Rigid nasal endoscopy  Indication: To evaluate for sinonasal pathology not visualized on routine anterior rhinoscopy  Anesthesia: 4% topical " lidocaine with 0.05% oxymetazoline  Description of procedure: A 30 degree, 3 mm rigid endoscope was inserted into bilateral nasal cavities and the nasal valves, nasal cavity, middle meatus, sphenoethmoid recess, and nasopharynx were thoroughly evaluated for evidence of obstruction, edema, purulence, polyps and/or mass/lesion.     Findings:  MM clear bilaterally, no pus or polyp  DNS to the right  IT non hypertrophic    The patient tolerated the procedure well without complication.         Assessment, Decision Making, and Plan:  (R44.8) Facial pressure  (primary encounter diagnosis)  Comment:   --differential includes allergy, sinusitis (not visualized on endoscopy), or possible atypical migraine  Plan:   --we will do empiric allergy therapy (OTC antihistamine, saline rinse, and flonase)  --if no improvement will consider CT in 2-3 weeks to rule out any anatomic issues or sinusitis that cannot be visualized on endoscopy  --will also keep atypical migraine with facial pain on the differential  --she will message us on mychart in 2 weeks      Zen Pride MD    [facility]  Rhinology  Endoscopic Skull Base Surgery  [facility]  Department of Otolaryngology - Head & Neck Surgery          Again, thank you for allowing me to participate in the care of your patient.      Sincerely,    Zen Pride MD

## 2023-12-08 NOTE — NURSING NOTE
"Chief Complaint   Patient presents with    Consult   Blood pressure 113/81, pulse 90, height 1.626 m (5' 4\"), weight 49 kg (108 lb 1.6 oz), SpO2 97%, not currently breastfeeding. Jair Shi, EMT    "

## 2024-01-13 ENCOUNTER — MYC MEDICAL ADVICE (OUTPATIENT)
Dept: OTOLARYNGOLOGY | Facility: CLINIC | Age: 39
End: 2024-01-13
Payer: COMMERCIAL

## 2024-01-13 DIAGNOSIS — R44.8 FACIAL PRESSURE: Primary | ICD-10-CM

## 2024-01-15 NOTE — TELEPHONE ENCOUNTER
Facial pain and pressure refractory to > 3 weeks of topical steroid spray and previous courses of antibiotics

## 2024-01-17 ENCOUNTER — TELEPHONE (OUTPATIENT)
Dept: OTOLARYNGOLOGY | Facility: CLINIC | Age: 39
End: 2024-01-17
Payer: COMMERCIAL

## 2024-02-09 ENCOUNTER — TELEPHONE (OUTPATIENT)
Dept: OTOLARYNGOLOGY | Facility: CLINIC | Age: 39
End: 2024-02-09
Payer: COMMERCIAL

## 2024-02-12 ENCOUNTER — TELEPHONE (OUTPATIENT)
Dept: OTOLARYNGOLOGY | Facility: CLINIC | Age: 39
End: 2024-02-12
Payer: COMMERCIAL

## 2024-02-26 ENCOUNTER — HOSPITAL ENCOUNTER (OUTPATIENT)
Dept: CT IMAGING | Facility: CLINIC | Age: 39
Discharge: HOME | End: 2024-02-26
Attending: STUDENT IN AN ORGANIZED HEALTH CARE EDUCATION/TRAINING PROGRAM | Admitting: STUDENT IN AN ORGANIZED HEALTH CARE EDUCATION/TRAINING PROGRAM
Payer: COMMERCIAL

## 2024-02-26 DIAGNOSIS — R44.8 FACIAL PRESSURE: ICD-10-CM

## 2024-02-26 PROCEDURE — 70486 CT MAXILLOFACIAL W/O DYE: CPT

## 2024-03-04 ENCOUNTER — MYC MEDICAL ADVICE (OUTPATIENT)
Dept: OTOLARYNGOLOGY | Facility: CLINIC | Age: 39
End: 2024-03-04
Payer: COMMERCIAL

## 2024-03-04 DIAGNOSIS — R44.8 FACIAL PRESSURE: Primary | ICD-10-CM

## 2024-06-22 ENCOUNTER — HEALTH MAINTENANCE LETTER (OUTPATIENT)
Age: 39
End: 2024-06-22

## 2025-07-12 ENCOUNTER — HEALTH MAINTENANCE LETTER (OUTPATIENT)
Age: 40
End: 2025-07-12